# Patient Record
Sex: FEMALE | Race: WHITE | NOT HISPANIC OR LATINO | Employment: FULL TIME | URBAN - METROPOLITAN AREA
[De-identification: names, ages, dates, MRNs, and addresses within clinical notes are randomized per-mention and may not be internally consistent; named-entity substitution may affect disease eponyms.]

---

## 2017-11-26 ENCOUNTER — HOSPITAL ENCOUNTER (EMERGENCY)
Facility: HOSPITAL | Age: 32
Discharge: HOME | End: 2017-11-26
Attending: EMERGENCY MEDICINE

## 2017-11-26 VITALS
RESPIRATION RATE: 16 BRPM | HEART RATE: 83 BPM | OXYGEN SATURATION: 99 % | WEIGHT: 130 LBS | DIASTOLIC BLOOD PRESSURE: 93 MMHG | SYSTOLIC BLOOD PRESSURE: 152 MMHG | BODY MASS INDEX: 22.2 KG/M2 | TEMPERATURE: 99 F | HEIGHT: 64 IN

## 2017-11-26 DIAGNOSIS — B00.1 HERPES LABIALIS WITHOUT COMPLICATION: ICD-10-CM

## 2017-11-26 DIAGNOSIS — L02.416 ABSCESS OF LEG, LEFT: Primary | ICD-10-CM

## 2017-11-26 LAB
B-HCG UR QL: NEGATIVE
CTP QC/QA: YES

## 2017-11-26 PROCEDURE — 10061 I&D ABSCESS COMP/MULTIPLE: CPT

## 2017-11-26 PROCEDURE — 99283 EMERGENCY DEPT VISIT LOW MDM: CPT | Mod: 25

## 2017-11-26 PROCEDURE — 81025 URINE PREGNANCY TEST: CPT | Performed by: NURSE PRACTITIONER

## 2017-11-26 PROCEDURE — 25000003 PHARM REV CODE 250

## 2017-11-26 PROCEDURE — 25000003 PHARM REV CODE 250: Performed by: NURSE PRACTITIONER

## 2017-11-26 RX ORDER — MUPIROCIN 20 MG/G
OINTMENT TOPICAL
Status: COMPLETED
Start: 2017-11-26 | End: 2017-11-26

## 2017-11-26 RX ORDER — MUPIROCIN 20 MG/G
1 OINTMENT TOPICAL ONCE
Status: DISCONTINUED | OUTPATIENT
Start: 2017-11-26 | End: 2017-11-26

## 2017-11-26 RX ORDER — VALACYCLOVIR HYDROCHLORIDE 1 G/1
1000 TABLET, FILM COATED ORAL EVERY 12 HOURS
Qty: 6 TABLET | Refills: 0 | Status: SHIPPED | OUTPATIENT
Start: 2017-11-26 | End: 2017-11-29

## 2017-11-26 RX ORDER — MUPIROCIN 20 MG/G
1 OINTMENT TOPICAL
Status: COMPLETED | OUTPATIENT
Start: 2017-11-26 | End: 2017-11-26

## 2017-11-26 RX ORDER — CEPHALEXIN 500 MG/1
500 CAPSULE ORAL 4 TIMES DAILY
Qty: 20 CAPSULE | Refills: 0 | Status: SHIPPED | OUTPATIENT
Start: 2017-11-26 | End: 2017-12-01

## 2017-11-26 RX ORDER — CEPHALEXIN 250 MG/1
500 CAPSULE ORAL
Status: COMPLETED | OUTPATIENT
Start: 2017-11-26 | End: 2017-11-26

## 2017-11-26 RX ORDER — VALACYCLOVIR HYDROCHLORIDE 500 MG/1
2000 TABLET, FILM COATED ORAL ONCE
Status: COMPLETED | OUTPATIENT
Start: 2017-11-26 | End: 2017-11-26

## 2017-11-26 RX ORDER — NAPROXEN 250 MG/1
500 TABLET ORAL
Status: COMPLETED | OUTPATIENT
Start: 2017-11-26 | End: 2017-11-26

## 2017-11-26 RX ORDER — LIDOCAINE HYDROCHLORIDE 20 MG/ML
5 INJECTION, SOLUTION EPIDURAL; INFILTRATION; INTRACAUDAL; PERINEURAL
Status: COMPLETED | OUTPATIENT
Start: 2017-11-26 | End: 2017-11-26

## 2017-11-26 RX ORDER — CEPHALEXIN 250 MG/1
CAPSULE ORAL
Status: COMPLETED
Start: 2017-11-26 | End: 2017-11-26

## 2017-11-26 RX ADMIN — CEPHALEXIN 500 MG: 250 CAPSULE ORAL at 09:11

## 2017-11-26 RX ADMIN — NAPROXEN 500 MG: 250 TABLET ORAL at 08:11

## 2017-11-26 RX ADMIN — VALACYCLOVIR HYDROCHLORIDE 2000 MG: 500 TABLET, FILM COATED ORAL at 09:11

## 2017-11-26 RX ADMIN — MUPIROCIN 22 G: 20 OINTMENT TOPICAL at 09:11

## 2017-11-26 RX ADMIN — LIDOCAINE HYDROCHLORIDE 100 MG: 20 INJECTION, SOLUTION INTRAVENOUS at 08:11

## 2017-11-27 NOTE — ED PROVIDER NOTES
Encounter Date: 11/26/2017    SCRIBE #1 NOTE: I, Nikolay Bowen, am scribing for, and in the presence of, Dilcia Morris NP .       History     Chief Complaint   Patient presents with    Knot on leg     Knot on leg x 2 weeks       11/26/2017 7:33 PM     Chief complaint: Abscess      Akin Morales is a 32 y.o. female with no pertinent PMHx who presents to the ED with complaints of a worsening erythematous painful and itchy abscess on her LLE calf x 2 weeks. She initially thought it was a insect bite does not recall being bitten by an anything. She denies fever, CP, SOB and dysuria. NKDA noted.       The history is provided by the patient.     Review of patient's allergies indicates:  No Known Allergies  History reviewed. No pertinent past medical history.  History reviewed. No pertinent surgical history.  History reviewed. No pertinent family history.  Social History   Substance Use Topics    Smoking status: Never Smoker    Smokeless tobacco: Never Used    Alcohol use Yes     Review of Systems   Constitutional: Negative for fever.   Respiratory: Negative for shortness of breath.    Cardiovascular: Negative for chest pain.   Gastrointestinal: Negative for nausea and vomiting.   Genitourinary: Negative for dysuria.   Musculoskeletal: Negative for back pain and joint swelling.   Skin: Positive for color change (erythematous abscess) and wound (Abscess). Negative for rash.   Allergic/Immunologic: Negative for immunocompromised state.   Neurological: Negative for weakness and numbness.   Hematological: Negative for adenopathy.       Physical Exam     Initial Vitals [11/26/17 1851]   BP Pulse Resp Temp SpO2   (!) 152/93 83 16 98.7 °F (37.1 °C) 99 %      MAP       112.67         Physical Exam    Nursing note and vitals reviewed.  Constitutional: She appears well-developed and well-nourished.   HENT:   Head: Normocephalic and atraumatic.   Eyes: EOM are normal. Pupils are equal, round, and reactive to light.   Neck:  Normal range of motion. Neck supple.   Cardiovascular: Normal rate, regular rhythm and normal heart sounds. Exam reveals no gallop and no friction rub.    No murmur heard.  Pulmonary/Chest: Breath sounds normal. No respiratory distress. She has no wheezes. She has no rhonchi. She has no rales.   Abdominal: She exhibits no distension. There is no tenderness.   Musculoskeletal: Normal range of motion.   Neurological: She is alert and oriented to person, place, and time.   Skin: Skin is warm, dry and intact. Capillary refill takes less than 2 seconds. Rash and abscess noted. No abrasion, no bruising, no burn, no ecchymosis, no laceration and no lesion noted. Rash is vesicular (lesion on right lower lip). There is erythema. No cyanosis. Nails show no clubbing.        Left posterior calf 4 x 4 flat erythematous area with small amount of central induration; no opening or drainage; no swelling; see attached image;  A second small 2 x 2 area of erythema with central induration and fluctuance proximal to area described above; see image attached.    Psychiatric: She has a normal mood and affect. Her behavior is normal. Judgment and thought content normal.         ED Course   I & D - Incision and Drainage  Date/Time: 11/28/2017 3:36 PM  Performed by: SHAE PAULSON  Authorized by: TOMAS RICHARDS III   Consent Done: Yes  Consent: Verbal consent obtained.  Risks and benefits: risks, benefits and alternatives were discussed  Consent given by: patient  Type: abscess  Body area: lower extremity  Location details: left leg  Anesthesia: local infiltration    Anesthesia:  Local Anesthetic: lidocaine 2% without epinephrine  Anesthetic total: 5 mL  Patient sedated: no  Description of findings: left posterior calf with 4 x 4 cm area of erythema and induration with central fluctuance   Scalpel size: 10  Incision type: single straight  Complexity: simple  Drainage: pus  Drainage amount: moderate  Wound treatment: incision,  drainage and   wound left open  Packing material: none  Complications: No  Specimens: No  Implants: No  Patient tolerance: Patient tolerated the procedure well with no immediate complications    I & D - Incision and Drainage  Date/Time: 11/28/2017 3:39 PM  Performed by: SHAE PAULSON  Authorized by: TOMAS RICHARDS III   Consent Done: Yes  Consent: Verbal consent obtained.  Risks and benefits: risks, benefits and alternatives were discussed  Consent given by: patient  Type: abscess  Body area: lower extremity  Anesthesia: local infiltration    Anesthesia:  Local Anesthetic: lidocaine 2% without epinephrine  Description of findings: 2 x 2 cm area of erythema with central induration and fluctuance   Scalpel size: 10  Incision type: single straight  Complexity: simple  Drainage: pus  Drainage amount: moderate  Wound treatment: incision,  drainage and  wound left open  Packing material: none  Complications: No  Specimens: No  Implants: No  Patient tolerance: Patient tolerated the procedure well with no immediate complications        Labs Reviewed - No data to display          Medical Decision Making:   Differential Diagnosis:   Cellulitis  Abscess  Insect bite       APC / Resident Notes:   Patient is a 32 y.o. female who presents to the ED 11/28/2017 who underwent emergent evaluation for abscess; on exam patient had two areas of abscess on posterior left calf; bedside ultrasound revealed 2 x 2 cm pocket of fluid beneath larger abscess and 1 x 1 cm fluid collection beneath smaller abscess consistent with abscess; erythema, induration and fluctuance localized without swelling and minimal tenderness; do not suspect cellulitis or DVT; patient is afebrile and non toxic appearing with stable vital sings; patient also has vesicular lesion on right lower lip; patient states she gets frequent cold sores in that area but has no insurance and therefore is treated with OTC cream only; patient admits to poor follow up due to cost and not having  insurance; I and D performed on both abscesses on left lower extremity as described above with moderate pustular drainage from both lesions; wounds not packed as very superficial small pockets and packing therefore would not remain in place; patient prescribed PO antibiotics as she admits she will likely not pay for follow up unless complications; patient given valtrex RX for herpes labialis; follow up and return precautions discussed at length with patient who verbalized understanding and is agreeable to plan of care.             Scribe Attestation:   Scribe #1: I performed the above scribed service and the documentation accurately describes the services I performed. I attest to the accuracy of the note.    Attending Attestation:           Physician Attestation for Scribe:  Physician Attestation Statement for Scribe #1: I, Dilcia Morris, reviewed documentation, as scribed by in my presence, and it is both accurate and complete.     Comments: I, MAREN Mcgill, personally performed the services described in this documentation. All medical record entries made by the scribe were at my direction and in my presence.  I have reviewed the chart and agree that the record reflects my personal performance and is accurate and complete. MAREN Mcgill.  8:22 PM 11/26/2017             ED Course      Clinical Impression:   Abscess  Herpes labialis                          Dilcia Morris NP  11/28/17 1548